# Patient Record
Sex: FEMALE | URBAN - METROPOLITAN AREA
[De-identification: names, ages, dates, MRNs, and addresses within clinical notes are randomized per-mention and may not be internally consistent; named-entity substitution may affect disease eponyms.]

---

## 2023-11-10 ENCOUNTER — APPOINTMENT (RX ONLY)
Dept: URBAN - METROPOLITAN AREA CLINIC 23 | Facility: CLINIC | Age: 48
Setting detail: DERMATOLOGY
End: 2023-11-10

## 2023-11-10 DIAGNOSIS — L71.8 OTHER ROSACEA: ICD-10-CM

## 2023-11-10 DIAGNOSIS — Z41.9 ENCOUNTER FOR PROCEDURE FOR PURPOSES OTHER THAN REMEDYING HEALTH STATE, UNSPECIFIED: ICD-10-CM

## 2023-11-10 DIAGNOSIS — L81.1 CHLOASMA: ICD-10-CM

## 2023-11-10 PROCEDURE — ? COUNSELING

## 2023-11-10 PROCEDURE — ? PICOWAY LASER

## 2023-11-10 PROCEDURE — 99213 OFFICE O/P EST LOW 20 MIN: CPT

## 2023-11-10 PROCEDURE — ? RECOMMENDATIONS

## 2023-11-10 PROCEDURE — ? VBEAM PERFECTA LASER

## 2023-11-10 PROCEDURE — ? PRESCRIPTION

## 2023-11-10 RX ORDER — FLUOCINOLONE ACETONIDE, HYDROQUINONE, AND TRETINOIN .1; 40; .5 MG/G; MG/G; MG/G
CREAM TOPICAL QHS
Qty: 30 | Refills: 2 | Status: ERX | COMMUNITY
Start: 2023-11-10

## 2023-11-10 RX ADMIN — FLUOCINOLONE ACETONIDE, HYDROQUINONE, AND TRETINOIN: .1; 40; .5 CREAM TOPICAL at 00:00

## 2023-11-10 ASSESSMENT — LOCATION SIMPLE DESCRIPTION DERM: LOCATION SIMPLE: RIGHT CHEEK

## 2023-11-10 ASSESSMENT — LOCATION DETAILED DESCRIPTION DERM: LOCATION DETAILED: RIGHT MEDIAL MALAR CHEEK

## 2023-11-10 ASSESSMENT — LOCATION ZONE DERM: LOCATION ZONE: FACE

## 2023-11-10 NOTE — PROCEDURE: RECOMMENDATIONS
Recommendations (Free Text): Vbeam laser 3-4 sessions $350 per session. Then corry laser full face spots $350 per session every 4 weeks.
Detail Level: Detailed
Render Risk Assessment In Note?: no

## 2023-11-10 NOTE — PROCEDURE: COUNSELING
Detail Level: Zone
Laser Recommendations: Theodora laser 3 or 4 treatments
Topical Retinoids Recommendations: Triluma pea sized at night \\nHydroquinone pads 10%
Chemical Peel Recommendations: VI PEEL

## 2023-11-10 NOTE — PROCEDURE: PICOWAY LASER
Handpiece: Resolve
Treatment Number: 1
Post-Procedure Care: Immediate endpoint: perifollicular erythema and edema. Vaseline and ice applied. Post care reviewed with patient.
Price (Use Numbers Only, No Special Characters Or $): [address]
Fluence (J/Cm2): 0.9
Treatment Number: 0
Location Override: face spots
Frequency (Hz): 6
Detail Level: Zone
Fluence (J/Cm2): 1.7
Wavelength: 1064 nm
Spot Size: 8.0 mm
Fluence (J/Cm2): 0.6
Consent: Written consent obtained, risks reviewed including but not limited to crusting, scabbing, blistering, scarring, darker or lighter pigmentary change, paradoxical hair regrowth, incomplete removal of hair and infection.
Wavelength: 532 nm
Spot Size: 6.5 mm
Post-Care Instructions: I reviewed with the patient in detail post-care instructions. Patient should avoid sun for a minimum of 4 weeks before and after treatment.
Spot Size: 6.0 mm x 6.0 mm
Frequency (Hz): 6Hz
Pre-Procedure: Prior to proceeding the treatment areas were cleaned and all present put on their eye protection.
Hide Pulse Duration?: No
Fluence (J/Cm2): 0.65
Handpiece: Zoom

## 2023-11-10 NOTE — PROCEDURE: VBEAM PERFECTA LASER
Spray Time (Ms): 0
Post-Care Instructions: I reviewed with the patient in detail post-care instructions. Cool compresses or cold gel packs may be applied after treatment. Exposure to the sun should be avoided and sun protection and sunscreen should be used.
Is There A Second Setting?: no
Detail Level: Zone
Pre-Procedure: The treatment areas identified by the patient were cleansed and treatment was performed with the parameters mentioned above.
Pulse Duration: 6 ms
Post-Procedure: Following the procedure the post care instructions were reviewed with the patient.
Location: face spots
Fluence (J/Cm2): 6.50
Treatment Number: 1
Consent: Written consent obtained. Risks were reviewed including but not limited to crusting, scabbing, blistering, scarring, darker or lighter pigmentary change, bruising, and/or incomplete response.
Price (Use Numbers Only, No Special Characters Or $): [address]
Spot Size: 10 mm

## 2023-12-15 ENCOUNTER — APPOINTMENT (RX ONLY)
Dept: URBAN - METROPOLITAN AREA CLINIC 23 | Facility: CLINIC | Age: 48
Setting detail: DERMATOLOGY
End: 2023-12-15

## 2023-12-15 DIAGNOSIS — Z41.9 ENCOUNTER FOR PROCEDURE FOR PURPOSES OTHER THAN REMEDYING HEALTH STATE, UNSPECIFIED: ICD-10-CM

## 2023-12-15 PROCEDURE — ? PICOWAY LASER

## 2023-12-15 NOTE — PROCEDURE: PIC[place]AY LASER
Handpiece: Resolve
Treatment Number: 1
Post-Procedure Care: Immediate endpoint: perifollicular erythema and edema. Vaseline and ice applied. Post care reviewed with patient.
Price (Use Numbers Only, No Special Characters Or $): 273
Fluence (J/Cm2): 0.9
Treatment Number: 0
Location Override: face spots
Frequency (Hz): 6
Detail Level: Zone
Treatment Number: 2
Fluence (J/Cm2): 1.5
Wavelength: 1064 nm
Spot Size: 8.0 mm
Fluence (J/Cm2): 0.6
Consent: Written consent obtained, risks reviewed including but not limited to crusting, scabbing, blistering, scarring, darker or lighter pigmentary change, paradoxical hair regrowth, incomplete removal of hair and infection.
Wavelength: 532 nm
Spot Size: 6.5 mm
Post-Care Instructions: I reviewed with the patient in detail post-care instructions. Patient should avoid sun for a minimum of 4 weeks before and after treatment.
Spot Size: 6.0 mm x 6.0 mm
Frequency (Hz): 6Hz
Pre-Procedure: Prior to proceeding the treatment areas were cleaned and all present put on their eye protection.
Hide Pulse Duration?: No
Handpiece: Zoom

## 2024-01-24 ENCOUNTER — APPOINTMENT (RX ONLY)
Dept: URBAN - METROPOLITAN AREA CLINIC 23 | Facility: CLINIC | Age: 49
Setting detail: DERMATOLOGY
End: 2024-01-24

## 2024-01-24 DIAGNOSIS — Z41.9 ENCOUNTER FOR PROCEDURE FOR PURPOSES OTHER THAN REMEDYING HEALTH STATE, UNSPECIFIED: ICD-10-CM

## 2024-01-24 PROCEDURE — ? IN-HOUSE DISPENSING PHARMACY

## 2024-01-24 PROCEDURE — ? PICOWAY LASER

## 2024-01-24 NOTE — PROCEDURE: IN-HOUSE DISPENSING PHARMACY
Product 4 Units Dispensed: 0
Product 66 Unit Type: mg
Name Of Product 26: Tylenol 500mg
Product 16 Application Directions: Apply to lashes at bedtime only
Product 10 Application Directions: Apply to face once daily
Product 1 Application Directions: Take one tablet post injection
Product 4 Application Directions: Apply to Eyelids at bedtime
Product 7 Amount/Unit (Numbers Only): 1
Product 19 Application Directions: Apply thin layer at bedtime to melasma.
Name Of Product 9: Brightening pads 6%
Product 22 Application Directions: Apply small amount to temple and forehead twice daily as indicated x 3 or 5 days
Product 12 Refills: 2
Product 13 Unit Type: unit(s)
Product 16 Unit Type: ml
Product 10 Unit Type: tablets
Product 23 Application Directions: Take by mouth.
Product 19 Unit Type: bottle(s)
Name Of Product 14: Valtrex 1 gram
Product 26 Application Directions: Take one pill by mouth
Name Of Product 17: Prednisone 20 mg
Product 15 Amount/Unit (Numbers Only): 30
Product 1 Unit Type: grams
Name Of Product 20: Triluma
Product 6 Application Directions: Take 1 pill by mouth today,and another tomorrow
Render Product Pricing In Note: No
Product 3 Application Directions: Apply to face daily as indicated
Product 9 Application Directions: Apply to affected area on the face area am as indicated.
Product 21 Application Directions: Apply pea size amount to entire face at bedtime only
Name Of Product 13: Duoderm thin
Product 21 Unit Type: tube(s)
Name Of Product 16: Latisse
Product 9 Unit Type: jar(s)
Product 14 Amount/Unit (Numbers Only): 500
Product 8 Application Directions: Take 1 tablet before the procedure by mouth
Product 11 Amount/Unit (Numbers Only): 3
Name Of Product 22: Calcipotriene/ 5 FLuoracil cream
Product 24 Price/Unit (In Dollars): 20
Product 13 Application Directions: Apply to wound site every 48 hours as indicated.
Name Of Product 4: Latisse 5 ml
Name Of Product 1: Prednisone 10mg
Product 7 Application Directions: Apply one drop in each eye
Product 24 Application Directions: Apply pea size amount to entire face at bedtime only.
Name Of Product 15: Nati
Name Of Product 18: Tretinoin cream 0.05%
Product 5 Unit Type: kit(s)
Name Of Product 3: Hydroquinone brightening pads 10%
Name Of Product 21: Tretinoin 0.05%
Name Of Product 6: Prednisone 10 mg
Product 16 Amount/Unit (Numbers Only): 5
Product 12 Application Directions: Apply to the entire face in the Am and Pm
Product 15 Application Directions: Apply to affected skin as indicated
Detail Level: Detailed
Product 1 Amount/Unit (Numbers Only): 10
Name Of Product 25: Valium 5mg
Name Of Product 8: Valium 5 mg
Product 18 Application Directions: Mix with cerave cream and apply to body daily after shower
Name Of Product 5: Upneeq
Product 14 Application Directions: Take one tablet prior to procedure. Continue twice daily x 3 days as indicated.
Product 11 Application Directions: Take one tablet prior to procedure and one tablet daily x 3 days
Name Of Product 23: Diazepam (Valium)
Name Of Product 24: Tretinoin 0.05% cream
Product 17 Application Directions: Take one tablet 20 mg today in office and 10 mg tomorrow if needed for swelling. Dispense in office.
Product 13 Price/Unit (In Dollars): $10.00
Product 20 Application Directions: Apply thin layer at bedtime to entire face
Product 1 Price/Unit (In Dollars): 0.00
Product 2 Application Directions: Apply a pea size amount to Entire face  at bedtime
Product 5 Application Directions: Apply one drop to each eye
Name Of Product 12: Skin Better Even tone Serum

## 2024-01-24 NOTE — PROCEDURE: PICOWAY LASER
Handpiece: Resolve
Treatment Number: 3
Post-Procedure Care: Immediate endpoint: perifollicular erythema and edema. Vaseline and ice applied. Post care reviewed with patient.
Price (Use Numbers Only, No Special Characters Or $): 350.00
Fluence (J/Cm2): 0.9
Treatment Number: 0
Location Override: face spots
Frequency (Hz): 6
Detail Level: Zone
Fluence (J/Cm2): 1.7
Wavelength: 1064 nm
Spot Size: 8.0 mm
Fluence (J/Cm2): 0.6
Consent: Written consent obtained, risks reviewed including but not limited to crusting, scabbing, blistering, scarring, darker or lighter pigmentary change, paradoxical hair regrowth, incomplete removal of hair and infection.
Wavelength: 532 nm
Spot Size: 6.5 mm
Post-Care Instructions: I reviewed with the patient in detail post-care instructions. Patient should avoid sun for a minimum of 4 weeks before and after treatment.
Spot Size: 6.0 mm x 6.0 mm
Frequency (Hz): 6Hz
Pre-Procedure: Prior to proceeding the treatment areas were cleaned and all present put on their eye protection.
Hide Pulse Duration?: No
Handpiece: Zoom